# Patient Record
Sex: FEMALE | Race: WHITE | Employment: FULL TIME | URBAN - METROPOLITAN AREA
[De-identification: names, ages, dates, MRNs, and addresses within clinical notes are randomized per-mention and may not be internally consistent; named-entity substitution may affect disease eponyms.]

---

## 2024-07-13 ENCOUNTER — OFFICE VISIT (OUTPATIENT)
Age: 24
End: 2024-07-13

## 2024-07-13 VITALS
TEMPERATURE: 98.1 F | HEIGHT: 65 IN | SYSTOLIC BLOOD PRESSURE: 126 MMHG | OXYGEN SATURATION: 98 % | DIASTOLIC BLOOD PRESSURE: 77 MMHG | HEART RATE: 85 BPM | BODY MASS INDEX: 34.77 KG/M2 | WEIGHT: 208.7 LBS

## 2024-07-13 DIAGNOSIS — R19.7 DIARRHEA OF PRESUMED INFECTIOUS ORIGIN: Primary | ICD-10-CM

## 2024-07-13 DIAGNOSIS — R11.0 NAUSEA: ICD-10-CM

## 2024-07-13 RX ORDER — ALBUTEROL SULFATE 90 UG/1
2 AEROSOL, METERED RESPIRATORY (INHALATION) EVERY 6 HOURS PRN
COMMUNITY
Start: 2022-08-10

## 2024-07-13 RX ORDER — CIPROFLOXACIN 250 MG/1
250 TABLET, FILM COATED ORAL 2 TIMES DAILY
Qty: 6 TABLET | Refills: 0 | Status: SHIPPED | OUTPATIENT
Start: 2024-07-13 | End: 2024-07-13 | Stop reason: CLARIF

## 2024-07-13 RX ORDER — AZITHROMYCIN 500 MG/1
500 TABLET, FILM COATED ORAL DAILY
Qty: 3 TABLET | Refills: 0 | Status: SHIPPED | OUTPATIENT
Start: 2024-07-13 | End: 2024-07-13

## 2024-07-13 RX ORDER — ONDANSETRON 4 MG/1
4 TABLET, ORALLY DISINTEGRATING ORAL 3 TIMES DAILY PRN
Qty: 10 TABLET | Refills: 0 | Status: SHIPPED | OUTPATIENT
Start: 2024-07-13 | End: 2024-07-13

## 2024-07-13 RX ORDER — AZITHROMYCIN 500 MG/1
500 TABLET, FILM COATED ORAL DAILY
Qty: 3 TABLET | Refills: 0 | Status: SHIPPED | OUTPATIENT
Start: 2024-07-13 | End: 2024-07-16

## 2024-07-13 RX ORDER — MONTELUKAST SODIUM 10 MG/1
10 TABLET ORAL NIGHTLY
COMMUNITY

## 2024-07-13 RX ORDER — ONDANSETRON 4 MG/1
4 TABLET, ORALLY DISINTEGRATING ORAL 3 TIMES DAILY PRN
Qty: 10 TABLET | Refills: 0 | Status: SHIPPED | OUTPATIENT
Start: 2024-07-13 | End: 2024-07-16

## 2024-07-13 RX ORDER — FLUTICASONE PROPIONATE AND SALMETEROL 250; 50 UG/1; UG/1
POWDER RESPIRATORY (INHALATION)
COMMUNITY

## 2024-07-13 RX ORDER — NORGESTIMATE AND ETHINYL ESTRADIOL 0.25-0.035
1 KIT ORAL DAILY
COMMUNITY

## 2024-07-13 ASSESSMENT — ENCOUNTER SYMPTOMS
DIARRHEA: 1
BLOATING: 1
COUGH: 0
ABDOMINAL PAIN: 1

## 2024-07-13 NOTE — PATIENT INSTRUCTIONS
Due to persistence of diarrhea with mucus will start on course of antibiotic.  Take for 3 days.  Antinausea medication to help stay hydrated.  Important to stay hydrated when having diarrhea.  Recommend starting with liquid diet and then   Recommend stopping Imodium as it does not appear to be working after days of use.  Return if symptoms persist or change.  Proceed to hospital for evaluation if any worsening symptoms or concerns such as persistent fever, persistent vomiting, abdominal pains.

## 2024-07-13 NOTE — PROGRESS NOTES
Shira Leal (:  2000) is a 24 y.o. female,New patient, here for evaluation of the following chief complaint(s):  Diarrhea (Diarrhea, nausea and fatigue for six days. )      ASSESSMENT/PLAN:  Visit Diagnoses and Associated Orders       Diarrhea of presumed infectious origin    -  Primary    azithromycin (ZITHROMAX) 500 MG tablet [72551]           Nausea        ondansetron (ZOFRAN-ODT) 4 MG disintegrating tablet [08085]           ORDERS WITHOUT AN ASSOCIATED DIAGNOSIS    montelukast (SINGULAIR) 10 MG tablet [85527]      fluticasone-salmeterol (ADVAIR) 250-50 MCG/ACT AEPB diskus inhaler [792126]      albuterol sulfate HFA (PROVENTIL;VENTOLIN;PROAIR) 108 (90 Base) MCG/ACT inhaler [17904]      FERROUS SULFATE PO [08184]      Fluticasone Propionate (FLONASE NA) [39539]      ESTARYLLA 0.25-35 MG-MCG per tablet [770804]               Due to persistence of diarrhea with mucus will start on course of antibiotic.  Take for 3 days.  Antinausea medication to help stay hydrated.  Important to stay hydrated when having diarrhea.  Recommend starting with liquid diet and then   Recommend stopping Imodium as it does not appear to be working after days of use.  Return if symptoms persist or change.  Proceed to hospital for evaluation if any worsening symptoms or concerns such as persistent fever, persistent vomiting, abdominal pains.    SUBJECTIVE/OBJECTIVE:    Shira here today c/o persisting diarrhea starting 6 days ago.  Currently working as camp counselor.  Denies blood in stool.  Admits stool is watery with mucus present.  Denies known exposure.  Has not been drinking from natural water source.  Denies fever.  Has tried Imodium with little benefit.        History provided by:  Patient   used: No    Diarrhea   This is a new problem. The current episode started in the past 7 days (6 days). The problem occurs 2 to 4 times per day. The problem has been gradually worsening. The stool consistency is